# Patient Record
Sex: MALE | Race: OTHER | Employment: FULL TIME | URBAN - METROPOLITAN AREA
[De-identification: names, ages, dates, MRNs, and addresses within clinical notes are randomized per-mention and may not be internally consistent; named-entity substitution may affect disease eponyms.]

---

## 2018-01-08 ENCOUNTER — HOSPITAL ENCOUNTER (EMERGENCY)
Facility: HOSPITAL | Age: 23
Discharge: HOME | End: 2018-01-08
Attending: EMERGENCY MEDICINE
Payer: COMMERCIAL

## 2018-01-08 ENCOUNTER — APPOINTMENT (OUTPATIENT)
Dept: ULTRASOUND IMAGING | Facility: HOSPITAL | Age: 23
End: 2018-01-08
Attending: EMERGENCY MEDICINE
Payer: COMMERCIAL

## 2018-01-08 ENCOUNTER — APPOINTMENT (OUTPATIENT)
Dept: MRI IMAGING | Facility: HOSPITAL | Age: 23
End: 2018-01-08
Attending: EMERGENCY MEDICINE
Payer: COMMERCIAL

## 2018-01-08 VITALS
RESPIRATION RATE: 16 BRPM | HEIGHT: 70 IN | BODY MASS INDEX: 31.5 KG/M2 | OXYGEN SATURATION: 97 % | WEIGHT: 220 LBS | HEART RATE: 84 BPM | DIASTOLIC BLOOD PRESSURE: 81 MMHG | TEMPERATURE: 98 F | SYSTOLIC BLOOD PRESSURE: 148 MMHG

## 2018-01-08 DIAGNOSIS — M54.40 BACK PAIN OF LUMBAR REGION WITH SCIATICA: Primary | ICD-10-CM

## 2018-01-08 LAB
ALBUMIN SERPL-MCNC: 3.9 G/DL (ref 3.5–4.8)
ALP LIVER SERPL-CCNC: 69 U/L (ref 45–117)
ALT SERPL-CCNC: 43 U/L (ref 17–63)
AST SERPL-CCNC: 23 U/L (ref 15–41)
BASOPHILS # BLD AUTO: 0.06 X10(3) UL (ref 0–0.1)
BASOPHILS NFR BLD AUTO: 0.8 %
BILIRUB SERPL-MCNC: 0.8 MG/DL (ref 0.1–2)
BUN BLD-MCNC: 11 MG/DL (ref 8–20)
CALCIUM BLD-MCNC: 9 MG/DL (ref 8.3–10.3)
CHLORIDE: 107 MMOL/L (ref 101–111)
CO2: 25 MMOL/L (ref 22–32)
CREAT BLD-MCNC: 0.88 MG/DL (ref 0.7–1.3)
EOSINOPHIL # BLD AUTO: 0.32 X10(3) UL (ref 0–0.3)
EOSINOPHIL NFR BLD AUTO: 4.1 %
ERYTHROCYTE [DISTWIDTH] IN BLOOD BY AUTOMATED COUNT: 11.9 % (ref 11.5–16)
GLUCOSE BLD-MCNC: 96 MG/DL (ref 70–99)
HCT VFR BLD AUTO: 42.2 % (ref 37–53)
HGB BLD-MCNC: 14.8 G/DL (ref 13–17)
IMMATURE GRANULOCYTE COUNT: 0.08 X10(3) UL (ref 0–1)
IMMATURE GRANULOCYTE RATIO %: 1 %
LYMPHOCYTES # BLD AUTO: 2.82 X10(3) UL (ref 0.9–4)
LYMPHOCYTES NFR BLD AUTO: 36.3 %
M PROTEIN MFR SERPL ELPH: 7.9 G/DL (ref 6.1–8.3)
MCH RBC QN AUTO: 28.7 PG (ref 27–33.2)
MCHC RBC AUTO-ENTMCNC: 35.1 G/DL (ref 31–37)
MCV RBC AUTO: 81.8 FL (ref 80–99)
MONOCYTES # BLD AUTO: 0.63 X10(3) UL (ref 0.1–0.6)
MONOCYTES NFR BLD AUTO: 8.1 %
NEUTROPHIL ABS PRELIM: 3.86 X10 (3) UL (ref 1.3–6.7)
NEUTROPHILS # BLD AUTO: 3.86 X10(3) UL (ref 1.3–6.7)
NEUTROPHILS NFR BLD AUTO: 49.7 %
PLATELET # BLD AUTO: 407 10(3)UL (ref 150–450)
POTASSIUM SERPL-SCNC: 4 MMOL/L (ref 3.6–5.1)
RBC # BLD AUTO: 5.16 X10(6)UL (ref 4.3–5.7)
RED CELL DISTRIBUTION WIDTH-SD: 35.4 FL (ref 35.1–46.3)
SODIUM SERPL-SCNC: 139 MMOL/L (ref 136–144)
WBC # BLD AUTO: 7.8 X10(3) UL (ref 4–13)

## 2018-01-08 PROCEDURE — 96375 TX/PRO/DX INJ NEW DRUG ADDON: CPT

## 2018-01-08 PROCEDURE — 99285 EMERGENCY DEPT VISIT HI MDM: CPT

## 2018-01-08 PROCEDURE — 85025 COMPLETE CBC W/AUTO DIFF WBC: CPT | Performed by: EMERGENCY MEDICINE

## 2018-01-08 PROCEDURE — 72148 MRI LUMBAR SPINE W/O DYE: CPT | Performed by: EMERGENCY MEDICINE

## 2018-01-08 PROCEDURE — 80053 COMPREHEN METABOLIC PANEL: CPT | Performed by: EMERGENCY MEDICINE

## 2018-01-08 PROCEDURE — 96376 TX/PRO/DX INJ SAME DRUG ADON: CPT

## 2018-01-08 PROCEDURE — 96374 THER/PROPH/DIAG INJ IV PUSH: CPT

## 2018-01-08 PROCEDURE — 93971 EXTREMITY STUDY: CPT | Performed by: EMERGENCY MEDICINE

## 2018-01-08 RX ORDER — PREDNISONE 20 MG/1
60 TABLET ORAL DAILY
Qty: 15 TABLET | Refills: 0 | Status: SHIPPED | OUTPATIENT
Start: 2018-01-08 | End: 2018-01-13

## 2018-01-08 RX ORDER — HYDROMORPHONE HYDROCHLORIDE 1 MG/ML
1 INJECTION, SOLUTION INTRAMUSCULAR; INTRAVENOUS; SUBCUTANEOUS EVERY 30 MIN PRN
Status: DISCONTINUED | OUTPATIENT
Start: 2018-01-08 | End: 2018-01-08

## 2018-01-08 RX ORDER — HYDROCODONE BITARTRATE AND ACETAMINOPHEN 5; 325 MG/1; MG/1
1 TABLET ORAL EVERY 4 HOURS PRN
Qty: 20 TABLET | Refills: 0 | Status: ON HOLD | OUTPATIENT
Start: 2018-01-08 | End: 2018-07-02

## 2018-01-08 RX ORDER — ONDANSETRON 2 MG/ML
4 INJECTION INTRAMUSCULAR; INTRAVENOUS ONCE
Status: COMPLETED | OUTPATIENT
Start: 2018-01-08 | End: 2018-01-08

## 2018-01-08 NOTE — ED PROVIDER NOTES
Patient Seen in: [facility] Emergency Department    History   Patient presents with:  Swelling Edema (cardiovascular, metabolic)    Stated Complaint: numbness to left thigh / r/o blood clot    HPI    This is a 49-year-old male who presents with cyst °C) (Temporal)   Resp 16   Ht 177.8 cm (5' 10\")   Wt 99.8 kg   SpO2 97%   BMI 31.57 kg/m²         Physical Exam  General: . Male in no respiratory distress  The patient is in no respiratory distress.  The patient is not septic or toxic    HEENT: Atraumati orders.    RAINBOW DRAW BLUE   RAINBOW DRAW LAVENDER   RAINBOW DRAW LIGHT GREEN   RAINBOW DRAW GOLD       ED Course as of Jan 08 2218  ------------------------------------------------------------       MDM         The patient was placed on monitors, IV was Normal with no visible mass. BONES:  No fracture, pars defect, or osseous lesion. CORD/CAUDA EQUINA:  Normal caliber, contour, and signal intensity.    LUMBAR DISC LEVELS L1-L2:  No significant disc/facet abnormality, spinal stenosis, or foraminal narrowin increasing pain or discomfort I have given a prescription for pain meds, prednisone and to return if increasing pain or discomfort    Disposition and Plan     Clinical Impression:  Back pain of lumbar region with sciatica  (primary encounter diagnosis)
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
## 2018-01-08 NOTE — ED NOTES
Spoke with [facility] - they stated pt is to been seen in ED do to the care that may be needed.

## 2018-01-09 ENCOUNTER — TELEPHONE (OUTPATIENT)
Dept: SURGERY | Facility: CLINIC | Age: 23
End: 2018-01-09

## 2018-01-09 NOTE — TELEPHONE ENCOUNTER
pts sister Milagro scheduled NP appt 1/11 with Dr González Gamboa by [address] for back pain of the lumbar region with sciatica;WC eligibility [facility]  [phone] Claim# [id number] on 1/9/18;pt informed -will be using [facility] ins

## 2018-01-11 ENCOUNTER — OFFICE VISIT (OUTPATIENT)
Dept: SURGERY | Facility: CLINIC | Age: 23
End: 2018-01-11

## 2018-01-11 VITALS — HEART RATE: 58 BPM | SYSTOLIC BLOOD PRESSURE: 118 MMHG | DIASTOLIC BLOOD PRESSURE: 70 MMHG | RESPIRATION RATE: 16 BRPM

## 2018-01-11 DIAGNOSIS — S80.12XS LEG HEMATOMA, LEFT, SEQUELA: Primary | ICD-10-CM

## 2018-01-11 PROCEDURE — 99205 OFFICE O/P NEW HI 60 MIN: CPT | Performed by: ANESTHESIOLOGY

## 2018-01-11 RX ORDER — TRAMADOL HYDROCHLORIDE 50 MG/1
TABLET ORAL
Refills: 0 | Status: ON HOLD | COMMUNITY
Start: 2017-12-28 | End: 2018-07-02

## 2018-01-11 NOTE — PATIENT INSTRUCTIONS
Refill policies:    • Allow 2-3 business days for refills; controlled substances may take longer.   • Contact your pharmacy at least 5 days prior to running out of medication and have them send an electronic request or submit request through the [facility] recommended that you have a procedure or additional testing performed. [facility]) will contact your insurance carrier to obtain pre-certification or prior authorization.     Unfortunately, [facility] has seen an increase in denial of paym
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
## 2018-01-15 ENCOUNTER — TELEPHONE (OUTPATIENT)
Dept: SURGERY | Facility: CLINIC | Age: 23
End: 2018-01-15

## 2018-06-30 ENCOUNTER — HOSPITAL ENCOUNTER (INPATIENT)
Facility: HOSPITAL | Age: 23
LOS: 1 days | Discharge: HOME | DRG: 552 | End: 2018-07-04
Attending: EMERGENCY MEDICINE | Admitting: HOSPITALIST
Payer: COMMERCIAL

## 2018-06-30 DIAGNOSIS — M54.59 INTRACTABLE LOW BACK PAIN: Primary | ICD-10-CM

## 2018-07-01 ENCOUNTER — APPOINTMENT (OUTPATIENT)
Dept: CT IMAGING | Facility: HOSPITAL | Age: 23
DRG: 552 | End: 2018-07-01
Attending: EMERGENCY MEDICINE
Payer: COMMERCIAL

## 2018-07-01 PROBLEM — M54.59 INTRACTABLE LOW BACK PAIN: Status: ACTIVE | Noted: 2018-07-01

## 2018-07-01 PROCEDURE — 99253 IP/OBS CNSLTJ NEW/EST LOW 45: CPT | Performed by: NEUROLOGICAL SURGERY

## 2018-07-01 PROCEDURE — 72131 CT LUMBAR SPINE W/O DYE: CPT | Performed by: EMERGENCY MEDICINE

## 2018-07-01 PROCEDURE — 99220 INITIAL OBSERVATION CARE,LEVL III: CPT | Performed by: HOSPITALIST

## 2018-07-01 RX ORDER — MORPHINE SULFATE 4 MG/ML
4 INJECTION, SOLUTION INTRAMUSCULAR; INTRAVENOUS EVERY 2 HOUR PRN
Status: DISCONTINUED | OUTPATIENT
Start: 2018-07-01 | End: 2018-07-04

## 2018-07-01 RX ORDER — METOCLOPRAMIDE HYDROCHLORIDE 5 MG/ML
10 INJECTION INTRAMUSCULAR; INTRAVENOUS EVERY 8 HOURS PRN
Status: DISCONTINUED | OUTPATIENT
Start: 2018-07-01 | End: 2018-07-04

## 2018-07-01 RX ORDER — SODIUM CHLORIDE 9 MG/ML
INJECTION, SOLUTION INTRAVENOUS CONTINUOUS
Status: ACTIVE | OUTPATIENT
Start: 2018-07-01 | End: 2018-07-01

## 2018-07-01 RX ORDER — ONDANSETRON 2 MG/ML
4 INJECTION INTRAMUSCULAR; INTRAVENOUS EVERY 6 HOURS PRN
Status: DISCONTINUED | OUTPATIENT
Start: 2018-07-01 | End: 2018-07-04

## 2018-07-01 RX ORDER — HYDROCODONE BITARTRATE AND ACETAMINOPHEN 5; 325 MG/1; MG/1
2 TABLET ORAL EVERY 4 HOURS PRN
Status: DISCONTINUED | OUTPATIENT
Start: 2018-07-01 | End: 2018-07-04

## 2018-07-01 RX ORDER — KETOROLAC TROMETHAMINE 30 MG/ML
30 INJECTION, SOLUTION INTRAMUSCULAR; INTRAVENOUS EVERY 6 HOURS PRN
Status: DISPENSED | OUTPATIENT
Start: 2018-07-01 | End: 2018-07-03

## 2018-07-01 RX ORDER — KETOROLAC TROMETHAMINE 30 MG/ML
30 INJECTION, SOLUTION INTRAMUSCULAR; INTRAVENOUS ONCE
Status: COMPLETED | OUTPATIENT
Start: 2018-07-01 | End: 2018-07-01

## 2018-07-01 RX ORDER — MORPHINE SULFATE 4 MG/ML
4 INJECTION, SOLUTION INTRAMUSCULAR; INTRAVENOUS ONCE
Status: COMPLETED | OUTPATIENT
Start: 2018-07-01 | End: 2018-07-01

## 2018-07-01 RX ORDER — ACETAMINOPHEN AND CODEINE PHOSPHATE 300; 30 MG/1; MG/1
1 TABLET ORAL EVERY 4 HOURS PRN
Status: ON HOLD | COMMUNITY
End: 2018-07-02

## 2018-07-01 RX ORDER — MORPHINE SULFATE 4 MG/ML
2 INJECTION, SOLUTION INTRAMUSCULAR; INTRAVENOUS EVERY 2 HOUR PRN
Status: DISCONTINUED | OUTPATIENT
Start: 2018-07-01 | End: 2018-07-04

## 2018-07-01 RX ORDER — DIAZEPAM 5 MG/ML
5 INJECTION, SOLUTION INTRAMUSCULAR; INTRAVENOUS ONCE
Status: COMPLETED | OUTPATIENT
Start: 2018-07-01 | End: 2018-07-01

## 2018-07-01 RX ORDER — ACETAMINOPHEN 325 MG/1
650 TABLET ORAL EVERY 6 HOURS PRN
Status: DISCONTINUED | OUTPATIENT
Start: 2018-07-01 | End: 2018-07-04

## 2018-07-01 RX ORDER — HYDROCODONE BITARTRATE AND ACETAMINOPHEN 5; 325 MG/1; MG/1
1 TABLET ORAL EVERY 4 HOURS PRN
Status: DISCONTINUED | OUTPATIENT
Start: 2018-07-01 | End: 2018-07-04

## 2018-07-01 RX ORDER — ORPHENADRINE CITRATE 30 MG/ML
30 INJECTION INTRAMUSCULAR; INTRAVENOUS 3 TIMES DAILY
Status: DISCONTINUED | OUTPATIENT
Start: 2018-07-01 | End: 2018-07-04

## 2018-07-01 RX ORDER — ACETAMINOPHEN 325 MG/1
650 TABLET ORAL EVERY 4 HOURS PRN
Status: DISCONTINUED | OUTPATIENT
Start: 2018-07-01 | End: 2018-07-04

## 2018-07-01 RX ORDER — LIDOCAINE 50 MG/G
1 PATCH TOPICAL ONCE
Status: COMPLETED | OUTPATIENT
Start: 2018-07-01 | End: 2018-07-02

## 2018-07-01 RX ORDER — GABAPENTIN 100 MG/1
100 CAPSULE ORAL 3 TIMES DAILY
Status: DISCONTINUED | OUTPATIENT
Start: 2018-07-01 | End: 2018-07-03

## 2018-07-01 NOTE — ED PROVIDER NOTES
Patient Seen in: [facility] Emergency Department    History   Patient presents with:  Numbness Weakness (neurologic)  Back Pain (musculoskeletal)    Stated Complaint: right leg numbness - had lumbar injections on Friday     HPI    41-year-old male p Wt 99.8 kg   SpO2 99%   BMI 30.68 kg/m²         Physical Exam   Constitutional: He is oriented to person, place, and time. He appears well-developed and well-nourished. No distress. HENT:   Head: Normocephalic and atraumatic.    Neck: Normal range of destiny has been receiving and has only been giving him a week's supply at a time. His last refill was 6/22. I discussed with him at this point that we could have been formally evaluated and admit him for observation. He requested this.   He subsequently was adm

## 2018-07-01 NOTE — ED INITIAL ASSESSMENT (HPI)
Pt presents to ED with complaint of back pain and right leg numbness. Pt reports history of spinal injury. Reports he received SI joint injections yesterday.  Reports sudden onset of severe back pain tonight with leg numbness

## 2018-07-01 NOTE — H&P
[facility] HOSPITALIST  History and Physical     [place] [facility] Patient Status:  Emergency    1995 MRN [id number]   Location [address] [facility] Attending Jai Espinal MD   Hosp Day # 0 PCP Girma Chandler DO     Chief Complaint: ba improved. The patient had a CT of his spine in the emergency room which is unchanged from his previous MRI. Past Medical History:  Past Medical History:   Diagnosis Date   • Back pain     herniated disc        Past Surgical History: History reviewed. lesions. Psychiatric: Appropriate mood and affect. Diagnostic Data:      Labs:  No results for input(s): WBC, HGB, MCV, PLT, BAND, INR in the last 168 hours.     Invalid input(s): LYM#, MONO#, BASOS#, EOSIN#    No results for input(s): GLU, BUN, CREA

## 2018-07-01 NOTE — CONSULTS
[address] Patient Status:  Observation    1995 MRN [id number]   [facility] 4NW-A Attending Dalila Ahumada, MD   Hosp Day # 0 PCP Edith Amaro DO     REASON FOR CONSULTATION:  Back and rig MEDICATIONS:    Prescriptions Prior to Admission:  Acetaminophen-Codeine #3 300-30 MG Oral Tab Take 1 tablet by mouth every 4 (four) hours as needed for Pain.  Disp:  Rfl:  6/30/2018 at Unknown time   TraMADol HCl 50 MG Oral Tab  Disp:  Rfl: 0 Not Takin himself   Sensation: intact to light touch bilaterally, slight decrease in distal L5 distribution   Reflexes: 2+, no clonus, no funes's   Coordination: deferred   Gait: deferred       DIAGNOSTIC DATA:      IMAGIN18 MRI lumbar with broad-based, la

## 2018-07-01 NOTE — PLAN OF CARE
NURSING ADMISSION NOTE      Patient admitted via Cart  Oriented to room. Safety precautions initiated. Bed in low position. Call light in reach. Pt received alert and oriented, wife at bedside. Both participating in admission navigator.  norco give

## 2018-07-01 NOTE — PLAN OF CARE
Pt is alert and oriented  X4. Vitals stable. C/o pain, not improving with pain meds taking only the edge off but pt verbalized that his numbness in the Rt leg is improving. Pt is staying in bed all the time. Toradol and Narco given for pain.  Will continue

## 2018-07-02 ENCOUNTER — APPOINTMENT (OUTPATIENT)
Dept: MRI IMAGING | Facility: HOSPITAL | Age: 23
DRG: 552 | End: 2018-07-02
Attending: NEUROLOGICAL SURGERY
Payer: COMMERCIAL

## 2018-07-02 ENCOUNTER — APPOINTMENT (OUTPATIENT)
Dept: GENERAL RADIOLOGY | Facility: HOSPITAL | Age: 23
DRG: 552 | End: 2018-07-02
Attending: NEUROLOGICAL SURGERY
Payer: COMMERCIAL

## 2018-07-02 PROCEDURE — 72114 X-RAY EXAM L-S SPINE BENDING: CPT | Performed by: NEUROLOGICAL SURGERY

## 2018-07-02 PROCEDURE — 72148 MRI LUMBAR SPINE W/O DYE: CPT | Performed by: NEUROLOGICAL SURGERY

## 2018-07-02 PROCEDURE — 99225 SUBSEQUENT OBSERVATION CARE: CPT | Performed by: INTERNAL MEDICINE

## 2018-07-02 RX ORDER — LORAZEPAM 2 MG/ML
0.5 INJECTION INTRAMUSCULAR ONCE
Status: COMPLETED | OUTPATIENT
Start: 2018-07-02 | End: 2018-07-02

## 2018-07-02 RX ORDER — HYDROCODONE BITARTRATE AND ACETAMINOPHEN 5; 325 MG/1; MG/1
1 TABLET ORAL EVERY 4 HOURS PRN
Qty: 20 TABLET | Refills: 0 | Status: SHIPPED | OUTPATIENT
Start: 2018-07-02

## 2018-07-02 RX ORDER — GABAPENTIN 100 MG/1
100 CAPSULE ORAL 3 TIMES DAILY
Qty: 90 CAPSULE | Refills: 2 | Status: SHIPPED | OUTPATIENT
Start: 2018-07-02 | End: 2018-07-04

## 2018-07-02 NOTE — PLAN OF CARE
PAIN - ADULT    • Verbalizes/displays adequate comfort level or patient's stated pain goal Progressing        Pain describes sharp tolow back rating 8/10;painmed given with relief;stated slept good last nite  Awaiting for MRI to be done;stated able to walk

## 2018-07-02 NOTE — PROGRESS NOTES
[facility] HOSPITALIST  Progress Note     Lonadilene Kobe Patient Status:  Observation    1995 MRN [id number]   [facility] 4NW-A Attending Georgette Luis MD   Hosp Day # 0 PCP Sully Casas DO     Chief Complaint: back pain    S: Ellen Mak no change from previous MRI   6. Started on gabapentin       Plan of care: MRI today    Quality:  · DVT Prophylaxis: SCD  · CODE status: Full  · Larsen: none  · Central line: none    Estimated date of discharge: TBD  Discharge is dependent on: progress  At

## 2018-07-02 NOTE — PROGRESS NOTES
Alert,oriented. Patient complains of lower back pain and pain to his right leg,Morpine IV x 2 doses given with some relief reported. MRI and lumber spine X-rays ordered. Both departments will see him today. Resting quietly at this time.
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
## 2018-07-02 NOTE — PHYSICAL THERAPY NOTE
Chart reviewed. Attempted to see patient this pm, but patient at x-ray. Will attempt to see later as schedule permits.

## 2018-07-03 ENCOUNTER — TELEPHONE (OUTPATIENT)
Dept: SURGERY | Facility: CLINIC | Age: 23
End: 2018-07-03

## 2018-07-03 PROCEDURE — 99225 SUBSEQUENT OBSERVATION CARE: CPT | Performed by: INTERNAL MEDICINE

## 2018-07-03 PROCEDURE — 99223 1ST HOSP IP/OBS HIGH 75: CPT | Performed by: ANESTHESIOLOGY

## 2018-07-03 RX ORDER — GABAPENTIN 300 MG/1
300 CAPSULE ORAL 3 TIMES DAILY
Status: DISCONTINUED | OUTPATIENT
Start: 2018-07-03 | End: 2018-07-04

## 2018-07-03 RX ORDER — CELECOXIB 100 MG/1
100 CAPSULE ORAL 2 TIMES DAILY
Status: DISCONTINUED | OUTPATIENT
Start: 2018-07-03 | End: 2018-07-04

## 2018-07-03 RX ORDER — CYCLOBENZAPRINE HCL 10 MG
10 TABLET ORAL 3 TIMES DAILY PRN
Status: DISCONTINUED | OUTPATIENT
Start: 2018-07-03 | End: 2018-07-04

## 2018-07-03 NOTE — PHYSICAL THERAPY NOTE
PHYSICAL THERAPY EVALUATION - INPATIENT     Room Number: 408/408-A  Evaluation Date: 7/3/2018  Type of Evaluation: Initial  Physician Order: PT Eval and Treat    Presenting Problem: L5-S1 HNP, lumbar radiculopathy   Reason for Therapy: Mobility Dysfu STRENGTH ASSESSMENT  Upper extremity ROM and strength are within functional limits     Lower extremity ROM is within functional limits     Lower extremity strength is within functional limits except for the following:    Right Hip flexion  3/5  Left Hip fl EOB. Pt sit-stand with RW and MOD I and increased time due to pain. Pt ambulated 10ft with RW and MOD I. Pt ambulates with slow vidya, flat foot initial contact, excessive shoulder elevation, and decreased stance time on R LE.  Pt given VC for posture, wa DISCHARGE RECOMMENDATIONS  PT Discharge Recommendations: Home    PLAN  PT Treatment Plan: Bed mobility; Endurance; Energy conservation;Patient education;Gait training;Range of motion;Strengthening;Stair training;Transfer training;Balance training  Rehab

## 2018-07-03 NOTE — PLAN OF CARE
Assumed care @ 3690. Patient c/o severe pain marco lower back and lower extremities, c/o numbness and tingling on right leg. Patient with limited movent on left leg. Norco and Morphine given as needed ,with mild relief.
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
## 2018-07-03 NOTE — PROGRESS NOTES
[facility] HOSPITALIST  Progress Note     Joellen Jessica Patient Status:  Observation    1995 MRN [id number]   [facility] 4NW-A Attending Jerica Edwards MD   Hosp Day # 0 PCP Juliana Rod DO     Chief Complaint: back pain    S: Wittenberg Guthrie for possible discectomy  6.  Started on gabapentin       Plan of care: await NS discussion regarding discectomy    Quality:  · DVT Prophylaxis: SCD  · CODE status: Full  · Larsen: none  · Central line: none    Estimated date of discharge: TBD  Discharge is d

## 2018-07-03 NOTE — PLAN OF CARE
PAIN - ADULT    • Verbalizes/displays adequate comfort level or patient's stated pain goal Progressing        SAFETY ADULT - FALL    • Free from fall injury Progressing          Patient alert and oriented X4, wife at bedside.  Per patient numbness on his ri

## 2018-07-03 NOTE — PAYOR COMM NOTE
--------------  Order Requisition   Place in observation Once (Order #[id number]) on 7/1/18        STATUS CHANGED TO INPT ON 7/3 FOR IV PAIN CONTROL  Order Requisition   Admit to inpatient Once (Order #[id number]) on 7/3/18     ADMISSION REVIEW     Payor: Apolinar Cabrera patch. He states it helped a little bit but not much. He was given another dose of morphine. He seemed very drowsy and he stated that things improved he was still very uncomfortable.   Subsequently I obtained a CT scan of his back which did not show any epidural injections to his lumbar spine. His spine surgeon felt that he may benefit from discectomy given his ongoing symptoms and failure of epidural injections.   The patient's pain physician felt that prior to proceeding with surgery bilateral SI joint narcotics, muscle relaxers and steroids. He did PT for 2-3 months. He saw Dr. Ximena Sabillon initially with no intervention. He then saw another pain management specialist as well as a surgeon in [place], Dr. Katy Carrillo.  He did not respond to PT or injections (had both 7/2 CONCLUSION:    1. As noted on prior MR imaging, there is a right lateral disc protrusion at the L5-S1 level that appears to abut versus mildly impinge upon the exiting right L5 nerve root.  This appears without significant interval change.   2. No addit Appropriate for INPT status per guidelines for persistent low back pain with radiation that requires IV pain control q2- 4hrs.

## 2018-07-03 NOTE — TELEPHONE ENCOUNTER
Per Nico Alfaro, Dr. Vi Nicholas ordered injection for pt. Dr. Fidel Painter notified.      Pt in room [address] [phone]

## 2018-07-03 NOTE — CONSULTS
Name: Miles Aguero   : 1995   DOS: 7/3/2018     Chief complaint: Low back pain    History of present illness:    Miles Aguero is a 21year old male who I initially evaluated in the office in 2018.   At that time, the patient was a deputy times daily. Disp: 90 capsule Rfl: 2   HYDROcodone-acetaminophen 5-325 MG Oral Tab Take 1 tablet by mouth every 4 (four) hours as needed. Disp: 20 tablet Rfl: 0     History reviewed. No pertinent surgical history.    Family History   Problem Relation Age of 5   Knee Flexion:    5    5   Ant. Tibialis:    5    5  Extensor Hallucis Longus:   5    5  Peroneals:     5    5  Gastrocsoleus:     5    5    Radiology diagnostic studies:   MRI from July 2 with evidence of lateral L5-S1 disc bulge on the right.   This is to control his pain, he can certainly be discharged home.   This will give him and his family additional time to consider his options whether that is conservative versus surgical.  The patient and his sister are in agreement and think this is a reasonable c

## 2018-07-04 VITALS
SYSTOLIC BLOOD PRESSURE: 138 MMHG | HEART RATE: 68 BPM | WEIGHT: 217.63 LBS | RESPIRATION RATE: 18 BRPM | DIASTOLIC BLOOD PRESSURE: 78 MMHG | HEIGHT: 71 IN | BODY MASS INDEX: 30.47 KG/M2 | OXYGEN SATURATION: 99 % | TEMPERATURE: 98 F

## 2018-07-04 PROCEDURE — 99239 HOSP IP/OBS DSCHRG MGMT >30: CPT | Performed by: INTERNAL MEDICINE

## 2018-07-04 RX ORDER — CYCLOBENZAPRINE HCL 10 MG
10 TABLET ORAL 3 TIMES DAILY PRN
Qty: 45 TABLET | Refills: 0 | Status: SHIPPED | OUTPATIENT
Start: 2018-07-04

## 2018-07-04 RX ORDER — GABAPENTIN 300 MG/1
300 CAPSULE ORAL 3 TIMES DAILY
Qty: 90 CAPSULE | Refills: 0 | Status: SHIPPED | OUTPATIENT
Start: 2018-07-04

## 2018-07-04 RX ORDER — CELECOXIB 100 MG/1
100 CAPSULE ORAL 2 TIMES DAILY
Qty: 30 CAPSULE | Refills: 0 | Status: SHIPPED | OUTPATIENT
Start: 2018-07-04

## 2018-07-04 NOTE — PROGRESS NOTES
[facility] HOSPITALIST  Progress Note     Deni Bertrma Patient Status:  Observation    1995 MRN [id number]   [facility] 4NW-A Attending Willa Mullins MD   Hosp Day # 1 PCP Becky Morgan DO     Chief Complaint: back pain    S: Angela Emanuel benefit  4. CT and MRI reviewed  5. Started on gabapentin       Plan of care: Dc planning    Quality:  · DVT Prophylaxis: SCD  · CODE status: Full  · Larsen: none  · Central line: none    Estimated date of discharge:  Today  Discharge is dependent on: mark

## 2018-07-04 NOTE — DISCHARGE SUMMARY
[facility] [place] HOSPITALIST  DISCHARGE SUMMARY     Johanna Brandon Patient Status:  Inpatient    1995 MRN [id number]   [facility] 4NW-A Attending Bryson Nguyen MD   Hosp Day # 1 PCP Latoya Boyle DO     Date of Admission: 2018  Date of states that it feels like his lower back is having significant spasms and he has having increasing radicular symptoms to  both of his legs though mostly in his right lower extremity. He has had some weakness and numbness as well.   No loss of urine or balbina known as:  TYLENOL #3        TraMADol HCl 50 MG Tabs  Commonly known as:  ULTRAM              Where to Get Your Medications      These medications were sent to [facility]/pharmacy #[id number]- Jeri Sorto, [phone]  [address] [place] [address]

## 2018-07-04 NOTE — PLAN OF CARE
Impaired Functional Mobility    • Achieve highest/safest level of mobility/gait Not Progressing        PAIN - ADULT    • Verbalizes/displays adequate comfort level or patient's stated pain goal Not Progressing          SAFETY ADULT - FALL    • Free from fa

## 2018-07-04 NOTE — PROGRESS NOTES
NURSING DISCHARGE NOTE    Discharged Home via Wheelchair. Accompanied by Family member  Belongings Taken by patient/family. Pt AOx4. C/o pain still, sent home on new prn pain medications. Plan is to follow up with his primary neurosx.  Films on CD

## 2020-09-07 ENCOUNTER — HOSPITAL ENCOUNTER (OUTPATIENT)
Age: 25
Discharge: HOME | End: 2020-09-07
Payer: COMMERCIAL

## 2020-09-07 VITALS
OXYGEN SATURATION: 96 % | SYSTOLIC BLOOD PRESSURE: 119 MMHG | TEMPERATURE: 99 F | RESPIRATION RATE: 18 BRPM | DIASTOLIC BLOOD PRESSURE: 79 MMHG | HEART RATE: 74 BPM

## 2020-09-07 DIAGNOSIS — K04.7 DENTAL INFECTION: Primary | ICD-10-CM

## 2020-09-07 PROCEDURE — 99204 OFFICE O/P NEW MOD 45 MIN: CPT

## 2020-09-07 PROCEDURE — 99203 OFFICE O/P NEW LOW 30 MIN: CPT

## 2020-09-07 RX ORDER — PENICILLIN V POTASSIUM 500 MG/1
500 TABLET ORAL 4 TIMES DAILY
Qty: 40 TABLET | Refills: 0 | Status: SHIPPED | OUTPATIENT
Start: 2020-09-07 | End: 2020-09-07

## 2020-09-07 RX ORDER — PENICILLIN V POTASSIUM 500 MG/1
500 TABLET ORAL 4 TIMES DAILY
Qty: 40 TABLET | Refills: 0 | Status: SHIPPED | OUTPATIENT
Start: 2020-09-07 | End: 2020-09-17
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
## 2020-09-07 NOTE — ED PROVIDER NOTES
Patient Seen in: [facility]      History   Patient presents with:  Dental Problem    Stated Complaint: dental pain    HPI    23 yo male here for evaluation of dental pain.   Pt reports his first molar on bottom right has a Pulmonary effort is normal.   Abdominal:      General: Abdomen is flat. Musculoskeletal: Normal range of motion. Skin:     General: Skin is warm. Neurological:      General: No focal deficit present.       Mental Status: He is alert and oriented to pe

## 2024-05-27 ENCOUNTER — HOSPITAL ENCOUNTER (OUTPATIENT)
Age: 29
Discharge: HOME | End: 2024-05-27

## 2024-05-27 VITALS
DIASTOLIC BLOOD PRESSURE: 72 MMHG | OXYGEN SATURATION: 99 % | HEART RATE: 108 BPM | RESPIRATION RATE: 20 BRPM | TEMPERATURE: 99 F | SYSTOLIC BLOOD PRESSURE: 147 MMHG

## 2024-05-27 DIAGNOSIS — Z20.822 LAB TEST NEGATIVE FOR COVID-19 VIRUS: ICD-10-CM

## 2024-05-27 DIAGNOSIS — R68.89 FLU-LIKE SYMPTOMS: ICD-10-CM

## 2024-05-27 DIAGNOSIS — Z20.822 ENCOUNTER FOR LABORATORY TESTING FOR COVID-19 VIRUS: ICD-10-CM

## 2024-05-27 DIAGNOSIS — J06.9 VIRAL UPPER RESPIRATORY TRACT INFECTION: Primary | ICD-10-CM

## 2024-05-27 PROBLEM — I82.409 DVT OF LOWER EXTREMITY (DEEP VENOUS THROMBOSIS) (HCC): Status: ACTIVE | Noted: 2018-08-06

## 2024-05-27 LAB
POCT INFLUENZA A: NEGATIVE
POCT INFLUENZA B: NEGATIVE
S PYO AG THROAT QL: NEGATIVE
SARS-COV-2 RNA RESP QL NAA+PROBE: NOT DETECTED

## 2024-05-27 PROCEDURE — 87502 INFLUENZA DNA AMP PROBE: CPT | Performed by: NURSE PRACTITIONER

## 2024-05-27 PROCEDURE — U0002 COVID-19 LAB TEST NON-CDC: HCPCS | Performed by: NURSE PRACTITIONER

## 2024-05-27 PROCEDURE — 99203 OFFICE O/P NEW LOW 30 MIN: CPT | Performed by: NURSE PRACTITIONER

## 2024-05-27 PROCEDURE — 87880 STREP A ASSAY W/OPTIC: CPT | Performed by: NURSE PRACTITIONER

## 2024-05-27 NOTE — ED PROVIDER NOTES
Patient Seen in: [facility]      History     Chief Complaint   Patient presents with   • Sore Throat     Headache, fever, body ache and sore throat - Entered by patient   • Body ache and/or chills     Stated Complaint: Sore Throat - Headache, fever, body ache and sore throat    Subjective:   Well-appearing 29-year-old male with hyperlipidemia, a history of a DVT, and a past surgical history of a spinal fusion presents with complaints of a sore throat, body aches, cough, and right ear pain since yesterday.  Patient communicates that he woke up with a fever of 102 today morning.  Patient communicates that he has been taking ibuprofen and Tylenol for symptoms.  Patient denies chest pain or shortness of breath.  Patient denies abdominal pain, nausea or vomiting.            Objective:   Past Medical History:   • Back pain    herniated disc              Past Surgical History:   Procedure Laterality Date   • Spinal fusion                  Social History     Socioeconomic History   • Marital status:    Tobacco Use   • Smoking status: Never   • Smokeless tobacco: Never              Review of Systems    Positive for stated complaint: Sore Throat - Headache, fever, body ache and sore throat  Other systems are as noted in HPI.  Constitutional and vital signs reviewed.      All other systems reviewed and negative except as noted above.    Physical Exam     ED Triage Vitals [05/27/24 1234]   /72   Pulse 119   Resp 20   Temp 99 °F (37.2 °C)   Temp src Temporal   SpO2 99 %   O2 Device None (Room air)       Current Vitals:   Vital Signs  BP: 147/72  Pulse: 108  Resp: 20  Temp: 99 °F (37.2 °C)  Temp src: Temporal    Oxygen Therapy  SpO2: 99 %  O2 Device: None (Room air)      Physical Exam  VS: Vital signs reviewed. 02 saturation within normal limits for this patient.    General: Patient is awake and alert, oriented to person, place and time. Pt appears non-toxic.     HEENT: Head is normocephalic,  atraumatic.  Nonicteric sclera, no conjunctival injection. No facial droop or slurred speech. No oral lesions or pallor. Mucous membranes moist.      Right Ear: Tympanic membrane, ear canal and external ear normal.      Left Ear: Tympanic membrane, ear canal and external ear normal.      Nose: Rhinorrhea present. No congestion.      Mouth/Throat:      Lips: Pink.      Mouth: Mucous membranes are moist.      Pharynx: Uvula midline. Posterior oropharyngeal erythema present. No pharyngeal swelling, oropharyngeal exudate or uvula swelling.      Tonsils: No tonsillar exudate or tonsillar abscesses. 2+ on the right. 2+ on the left.     Neck: No cervical lymphadenopathy. Supple. Normal ROM.    Heart: Regular rate and rhythm, normal S1, normal S2.    Lungs: Clear to auscultation. Good inspiratory effort. + Airway entry bilaterally without wheezes, rhonchi or crackles. No accessory muscle use or tachypnea.    Extremities: No focal swelling or tenderness. Capillary refill noted.     Skin: Warm, dry and normal in color.     Psychiatric: Normal affect, judgement normal, insight normal.     CNS: Moves all 4 extremities. Interacts appropriately. No gait abnormality. Memory normal.        ED Course     Labs Reviewed   POCT RAPID STREP - Normal   RAPID SARS-COV-2 BY PCR - Normal   POCT FLU TEST - Normal    Narrative:     This assay is a rapid molecular in vitro test utilizing nucleic acid amplification of influenza A and B viral RNA.       [facility]   Medical Decision Making  Well-appearing.  Rapid strep negative.  Rapid COVID-19 PCR not detected.  Influenza negative.  I discussed over-the-counter acetaminophen and or ibuprofen as needed for pain or discomfort.  I discussed over-the-counter cough and cold medications for symptoms.  I discussed hydration and rest.  Repeat heart rate improved.  Close PMD follow-up as well as return precautions discussed.    Problems Addressed:  Encounter for laboratory testing for COVID-19 virus: acute  illness or injury  Flu-like symptoms: acute illness or injury  Lab test negative for COVID-19 virus: acute illness or injury  Viral upper respiratory tract infection: acute illness or injury    Amount and/or Complexity of Data Reviewed  Labs: ordered. Decision-making details documented in ED Course.    Risk  OTC drugs.        Disposition and Plan     Clinical Impression:  1. Viral upper respiratory tract infection    2. Encounter for laboratory testing for COVID-19 virus    3. Flu-like symptoms    4. Lab test negative for COVID-19 virus         Disposition:  Discharge  5/27/2024  1:28 pm    Follow-up:  Dixie Alfaro MD  [address] [place] [id number]  [phone]    In 1 week  As needed          Medications Prescribed:  Discharge Medication List as of 5/27/2024  1:55 PM

## (undated) NOTE — ED AVS SNAPSHOT
Jennette Schlatter   MRN: [id number]    Department:  [facility] Emergency Department   Date of Visit:  1/8/2018           Disclosure     Insurance plans vary and the physician(s) referred by the ER may not be covered by your plan.  Please contact your physician (or your personal doctor if your instructions are to return to your personal doctor) about any new or lasting problems. The primary care or specialist physician will see patients referred from the [facility] Emergency Department.  Follow-up c

## (undated) NOTE — LETTER
Date: 1/11/2018    Patient Name: Kenya Huff          To Whom it may concern: This letter has been written at the patient's request. The above patient was seen at the [facility] for treatment of a medical condition.     This patient marianau